# Patient Record
Sex: FEMALE | Race: WHITE | NOT HISPANIC OR LATINO | URBAN - METROPOLITAN AREA
[De-identification: names, ages, dates, MRNs, and addresses within clinical notes are randomized per-mention and may not be internally consistent; named-entity substitution may affect disease eponyms.]

---

## 2017-10-17 PROBLEM — 10743008 IRRITABLE BOWEL SYNDROME: Status: ACTIVE | Noted: 2017-10-17

## 2017-10-17 PROBLEM — 414916001 OBESITY: Status: ACTIVE | Noted: 2017-10-17

## 2020-02-19 PROBLEM — 449681000124101 LONG-TERM CURRENT USE OF ANTIPLATELET DRUG: Status: ACTIVE | Noted: 2020-02-19

## 2020-06-08 ENCOUNTER — OFFICE VISIT (OUTPATIENT)
Dept: URBAN - METROPOLITAN AREA CLINIC 27 | Facility: CLINIC | Age: 75
End: 2020-06-08

## 2020-06-10 ENCOUNTER — LAB OUTSIDE AN ENCOUNTER (OUTPATIENT)
Dept: URBAN - METROPOLITAN AREA CLINIC 121 | Facility: CLINIC | Age: 75
End: 2020-06-10

## 2020-06-10 LAB
BASOPH COUNT: (no result)
BASOPHIL %: 1
BG RANDOM: 87
BUN/CREAT: (no result)
BUN: 10
CALCIUM: 9.4
CHLORIDE: 106
CO2: 28
CREATININE: 0.67
EOS COUNT: (no result)
EOSINOPHIL %: 1.8
HCT: 38.2
HGB: 13
LYMPHS %: 36.2
MAGNESIUM: 1.9
MCH: 33.3
MCHC: (no result)
MCV: 97.9
MONOCYTE %: 11.6
MONOSCT AUTO: (no result)
PLATELETS: (no result)
PMN %: 49.4
POTASSIUM: 4.1
RBC: (no result)
RDW: 12.2
WBC: (no result)
ZZ-GE-UNK: (no result)
ZZ-GE-UNK: (no result)

## 2021-01-12 ENCOUNTER — OFFICE VISIT (OUTPATIENT)
Dept: URBAN - METROPOLITAN AREA CLINIC 27 | Facility: CLINIC | Age: 76
End: 2021-01-12

## 2021-01-13 ENCOUNTER — LAB OUTSIDE AN ENCOUNTER (OUTPATIENT)
Dept: URBAN - METROPOLITAN AREA CLINIC 121 | Facility: CLINIC | Age: 76
End: 2021-01-13

## 2021-01-13 LAB
BASOPH COUNT: (no result)
BASOPHIL %: 1
BG RANDOM: 82
BUN/CREAT: (no result)
BUN: 16
CALCIUM: 9.6
CHLORIDE: 104
CO2: 29
CREATININE: 0.52
EOS COUNT: (no result)
EOSINOPHIL %: 1.8
HCT: 39.9
HGB: 13.3
LYMPHS %: 29.5
MCH: 32.8
MCHC: (no result)
MCV: 98.5
MONOCYTE %: 11.6
MONOSCT AUTO: (no result)
PLATELETS: (no result)
PMN %: 56.1
POTASSIUM: 4.5
RBC: (no result)
RDW: 11.5
WBC: (no result)
ZZ-GE-UNK: (no result)
ZZ-GE-UNK: (no result)

## 2021-02-23 ENCOUNTER — OFFICE VISIT (OUTPATIENT)
Dept: URBAN - METROPOLITAN AREA CLINIC 27 | Facility: CLINIC | Age: 76
End: 2021-02-23

## 2021-02-24 ENCOUNTER — LAB OUTSIDE AN ENCOUNTER (OUTPATIENT)
Dept: URBAN - METROPOLITAN AREA CLINIC 121 | Facility: CLINIC | Age: 76
End: 2021-02-24

## 2021-02-24 LAB
B-12: 382
BASOPH COUNT: (no result)
BASOPHIL %: 0.8
BG RANDOM: 181
BUN/CREAT: (no result)
BUN: 12
CALCIUM: 9.6
CHLORIDE: 103
CO2: 32
CREATININE: 0.56
EOS COUNT: (no result)
EOSINOPHIL %: 0.6
FOLATE: 18.3
HCT: 39.8
HGB: 13.1
LYMPHS %: 28.5
MCH: 33.2
MCHC: (no result)
MCV: 101
MONOCYTE %: 8.4
MONOSCT AUTO: (no result)
PLATELETS: (no result)
PMN %: 61.7
POTASSIUM: 3.8
RBC: (no result)
RDW: 11.8
TSH: 0.48
WBC: (no result)
ZZ-GE-UNK: (no result)
ZZ-GE-UNK: (no result)

## 2021-03-03 ENCOUNTER — LAB OUTSIDE AN ENCOUNTER (OUTPATIENT)
Dept: URBAN - METROPOLITAN AREA CLINIC 121 | Facility: CLINIC | Age: 76
End: 2021-03-03

## 2021-03-03 LAB
BG RANDOM: 91
ZZ-GE-UNK: (no result)

## 2022-04-30 ENCOUNTER — TELEPHONE ENCOUNTER (OUTPATIENT)
Dept: URBAN - METROPOLITAN AREA CLINIC 121 | Facility: CLINIC | Age: 77
End: 2022-04-30

## 2022-04-30 RX ORDER — OMEPRAZOLE 20 MG/1
QD CAPSULE, DELAYED RELEASE ORAL
OUTPATIENT
Start: 2013-08-30 | End: 2017-10-17

## 2022-04-30 RX ORDER — BUDESONIDE 3 MG/1
3 PILLS PO QD CAPSULE, COATED PELLETS ORAL
OUTPATIENT
Start: 2021-01-12

## 2022-04-30 RX ORDER — OMEPRAZOLE 20 MG/1
QD CAPSULE, DELAYED RELEASE ORAL
OUTPATIENT
Start: 2013-08-30

## 2022-04-30 RX ORDER — ASPIRIN 81 MG/1
TABLET, CHEWABLE ORAL
OUTPATIENT
Start: 2013-08-30

## 2022-04-30 RX ORDER — PRAVASTATIN SODIUM 20 MG/1
TABLET ORAL
OUTPATIENT
Start: 2013-08-30

## 2022-05-01 ENCOUNTER — TELEPHONE ENCOUNTER (OUTPATIENT)
Dept: URBAN - METROPOLITAN AREA CLINIC 121 | Facility: CLINIC | Age: 77
End: 2022-05-01

## 2022-05-01 RX ORDER — CALCIUM CITRATE/VITAMIN D3 200MG-6.25
TABLET ORAL
Status: ACTIVE | COMMUNITY
Start: 2017-10-17

## 2022-05-01 RX ORDER — ESCITALOPRAM 5 MG/1
TABLET, FILM COATED ORAL
Status: ACTIVE | COMMUNITY
Start: 2017-10-17

## 2022-05-01 RX ORDER — ERGOCALCIFEROL (VITAMIN D2) 10 MCG
QD TABLET ORAL
Status: ACTIVE | COMMUNITY
Start: 2013-10-08

## 2022-05-01 RX ORDER — ASPIRIN 81 MG/1
QD TABLET, CHEWABLE ORAL
Status: ACTIVE | COMMUNITY
Start: 2013-10-08

## 2022-05-01 RX ORDER — DICYCLOMINE HYDROCHLORIDE 20 MG/2ML
1CAPSULE PO Q8H PRN SPASMS INJECTION, SOLUTION INTRAMUSCULAR
Status: ACTIVE | COMMUNITY
Start: 2017-10-17

## 2022-05-01 RX ORDER — CALCIUM CARBONATE/VITAMIN D3 600 MG-20
TABLET ORAL
Status: ACTIVE | COMMUNITY
Start: 2020-02-19

## 2022-05-01 RX ORDER — BUDESONIDE 3 MG/1
TAKE 3 CAPSULES BY MOUTH ONCE A DAY CAPSULE, COATED PELLETS ORAL
Status: ACTIVE | COMMUNITY
Start: 2021-03-08

## 2022-05-01 RX ORDER — ESTRADIOL 10 UG/1
TABLET, FILM COATED VAGINAL
Status: ACTIVE | COMMUNITY
Start: 2020-02-19

## 2022-05-01 RX ORDER — PRAVASTATIN SODIUM 20 MG/1
QD TABLET ORAL
Status: ACTIVE | COMMUNITY
Start: 2013-10-08

## 2022-05-01 RX ORDER — DIPHENOXYLATE HCL/ATROPINE 2.5-.025MG
1 TABLET PO Q6H PRN TABLET ORAL
Status: ACTIVE | COMMUNITY
Start: 2020-06-08

## 2022-05-01 RX ORDER — ASPIRIN 81 MG/1
TABLET, DELAYED RELEASE ORAL
Status: ACTIVE | COMMUNITY
Start: 2020-02-19

## 2022-05-16 ENCOUNTER — TELEPHONE ENCOUNTER (OUTPATIENT)
Dept: URBAN - METROPOLITAN AREA CLINIC 27 | Facility: CLINIC | Age: 77
End: 2022-05-16

## 2022-06-08 ENCOUNTER — OFFICE VISIT (OUTPATIENT)
Dept: URBAN - METROPOLITAN AREA CLINIC 27 | Facility: CLINIC | Age: 77
End: 2022-06-08
Payer: COMMERCIAL

## 2022-06-08 ENCOUNTER — WEB ENCOUNTER (OUTPATIENT)
Dept: URBAN - METROPOLITAN AREA CLINIC 27 | Facility: CLINIC | Age: 77
End: 2022-06-08

## 2022-06-08 VITALS
HEIGHT: 65 IN | HEART RATE: 67 BPM | SYSTOLIC BLOOD PRESSURE: 112 MMHG | BODY MASS INDEX: 19.49 KG/M2 | RESPIRATION RATE: 17 BRPM | DIASTOLIC BLOOD PRESSURE: 67 MMHG | WEIGHT: 117 LBS | TEMPERATURE: 97.1 F

## 2022-06-08 DIAGNOSIS — R19.7 DIARRHEA, UNSPECIFIED TYPE: ICD-10-CM

## 2022-06-08 DIAGNOSIS — K52.832 LYMPHOCYTIC COLITIS: ICD-10-CM

## 2022-06-08 PROCEDURE — 99213 OFFICE O/P EST LOW 20 MIN: CPT | Performed by: INTERNAL MEDICINE

## 2022-06-08 RX ORDER — BUDESONIDE 3 MG/1
3 CAPSULE, COATED PELLETS ORAL ONCE A DAY
Qty: 90 | Refills: 2 | OUTPATIENT

## 2022-06-08 RX ORDER — BUDESONIDE 3 MG/1
TAKE 3 CAPSULES BY MOUTH ONCE A DAY CAPSULE, COATED PELLETS ORAL
Status: ACTIVE | COMMUNITY
Start: 2021-03-08

## 2022-06-08 RX ORDER — ERGOCALCIFEROL (VITAMIN D2) 10 MCG
QD TABLET ORAL
Status: ACTIVE | COMMUNITY
Start: 2013-10-08

## 2022-06-08 RX ORDER — PRAVASTATIN SODIUM 20 MG/1
QD TABLET ORAL
Status: ACTIVE | COMMUNITY
Start: 2013-10-08

## 2022-06-08 RX ORDER — CALCIUM CARBONATE/VITAMIN D3 600 MG-20
TABLET ORAL
Status: ACTIVE | COMMUNITY
Start: 2020-02-19

## 2022-06-08 RX ORDER — CALCIUM CITRATE/VITAMIN D3 200MG-6.25
TABLET ORAL
Status: ACTIVE | COMMUNITY
Start: 2017-10-17

## 2022-06-08 RX ORDER — ASPIRIN 81 MG/1
QD TABLET, CHEWABLE ORAL
Status: ACTIVE | COMMUNITY
Start: 2013-10-08

## 2022-06-08 RX ORDER — DICYCLOMINE HYDROCHLORIDE 20 MG/2ML
1CAPSULE PO Q8H PRN SPASMS INJECTION, SOLUTION INTRAMUSCULAR
Status: ACTIVE | COMMUNITY
Start: 2017-10-17

## 2022-06-08 RX ORDER — ESTRADIOL 10 UG/1
TABLET, FILM COATED VAGINAL
Status: ACTIVE | COMMUNITY
Start: 2020-02-19

## 2022-06-08 RX ORDER — DIPHENOXYLATE HCL/ATROPINE 2.5-.025MG
1 TABLET PO Q6H PRN TABLET ORAL
Status: ACTIVE | COMMUNITY
Start: 2020-06-08

## 2022-06-08 RX ORDER — ASPIRIN 81 MG/1
TABLET, DELAYED RELEASE ORAL
Status: ACTIVE | COMMUNITY
Start: 2020-02-19

## 2022-06-08 RX ORDER — ESCITALOPRAM 5 MG/1
TABLET, FILM COATED ORAL
Status: ACTIVE | COMMUNITY
Start: 2017-10-17

## 2022-06-08 NOTE — HPI-TODAY'S VISIT:
Ms. Wells is a 78 y/o established patient with lymphocytic colitis who was asymptomaticfor about 1 year. She stopped taking Budesonide im early 2021. She presents today with diarrhea. She has greater than 10 watery stools per day. Imodium is not helpful. Her last colonoscopy was march 2020.

## 2022-06-09 ENCOUNTER — LAB OUTSIDE AN ENCOUNTER (OUTPATIENT)
Dept: URBAN - METROPOLITAN AREA CLINIC 27 | Facility: CLINIC | Age: 77
End: 2022-06-09

## 2022-06-15 LAB
CALPROTECTIN, FECAL: 673
CLOSTRIDIUM DIFFICILE: (no result)

## 2022-06-23 ENCOUNTER — CLAIMS CREATED FROM THE CLAIM WINDOW (OUTPATIENT)
Dept: URBAN - METROPOLITAN AREA CLINIC 27 | Facility: CLINIC | Age: 77
End: 2022-06-23
Payer: COMMERCIAL

## 2022-06-23 VITALS
HEART RATE: 83 BPM | RESPIRATION RATE: 17 BRPM | SYSTOLIC BLOOD PRESSURE: 130 MMHG | WEIGHT: 117 LBS | TEMPERATURE: 97.3 F | HEIGHT: 65 IN | DIASTOLIC BLOOD PRESSURE: 86 MMHG | BODY MASS INDEX: 19.49 KG/M2

## 2022-06-23 DIAGNOSIS — R19.7 DIARRHEA, UNSPECIFIED: ICD-10-CM

## 2022-06-23 DIAGNOSIS — K21.00 ALKALINE REFLUX ESOPHAGITIS: ICD-10-CM

## 2022-06-23 DIAGNOSIS — R10.84 GENERALIZED ABDOMINAL PAIN: ICD-10-CM

## 2022-06-23 DIAGNOSIS — K21.0 GASTRO-ESOPHAGEAL REFLUX DISEASE WITH ESOPHAGITIS: ICD-10-CM

## 2022-06-23 DIAGNOSIS — K52.832 LYMPHOCYTIC COLITIS: ICD-10-CM

## 2022-06-23 PROCEDURE — 99214 OFFICE O/P EST MOD 30 MIN: CPT | Performed by: INTERNAL MEDICINE

## 2022-06-23 RX ORDER — PRAVASTATIN SODIUM 20 MG/1
QD TABLET ORAL
Status: ACTIVE | COMMUNITY
Start: 2013-10-08

## 2022-06-23 RX ORDER — DIPHENOXYLATE HCL/ATROPINE 2.5-.025MG
1 TABLET PO Q6H PRN TABLET ORAL
Status: ACTIVE | COMMUNITY
Start: 2020-06-08

## 2022-06-23 RX ORDER — ESCITALOPRAM 5 MG/1
TABLET, FILM COATED ORAL
Status: ACTIVE | COMMUNITY
Start: 2017-10-17

## 2022-06-23 RX ORDER — ESTRADIOL 10 UG/1
TABLET, FILM COATED VAGINAL
Status: ACTIVE | COMMUNITY
Start: 2020-02-19

## 2022-06-23 RX ORDER — ERGOCALCIFEROL (VITAMIN D2) 10 MCG
QD TABLET ORAL
Status: ACTIVE | COMMUNITY
Start: 2013-10-08

## 2022-06-23 RX ORDER — ASPIRIN 81 MG/1
TABLET, DELAYED RELEASE ORAL
Status: ACTIVE | COMMUNITY
Start: 2020-02-19

## 2022-06-23 RX ORDER — BUDESONIDE 3 MG/1
TAKE 3 CAPSULES BY MOUTH ONCE A DAY CAPSULE, COATED PELLETS ORAL
Status: ACTIVE | COMMUNITY
Start: 2021-03-08

## 2022-06-23 RX ORDER — CALCIUM CITRATE/VITAMIN D3 200MG-6.25
TABLET ORAL
Status: ACTIVE | COMMUNITY
Start: 2017-10-17

## 2022-06-23 RX ORDER — BUDESONIDE 3 MG/1
3 CAPSULE, COATED PELLETS ORAL ONCE A DAY
Qty: 90 | Refills: 2 | Status: ACTIVE | COMMUNITY

## 2022-06-23 RX ORDER — DICYCLOMINE HYDROCHLORIDE 20 MG/2ML
1CAPSULE PO Q8H PRN SPASMS INJECTION, SOLUTION INTRAMUSCULAR
Status: ACTIVE | COMMUNITY
Start: 2017-10-17

## 2022-06-23 RX ORDER — ASPIRIN 81 MG/1
QD TABLET, CHEWABLE ORAL
Status: ACTIVE | COMMUNITY
Start: 2013-10-08

## 2022-06-23 RX ORDER — CALCIUM CARBONATE/VITAMIN D3 600 MG-20
TABLET ORAL
Status: ACTIVE | COMMUNITY
Start: 2020-02-19

## 2022-06-23 NOTE — HPI-TODAY'S VISIT:
This is a 77-year-old female seen in consultation for her diarrhea.  She has lymphocytic colitis.  She had a flare about a month ago that was severe.  With daily loose stools.  C. difficile was negative but fecal calprotectin was elevated.  She was placed on budesonide 3 pills a day and within 2 weeks her symptoms have mostly resolved.  She is now on 2 pills a day the cramping has resolved.  No bleeding.  Her weight is stable

## 2022-06-24 PROBLEM — 102614006 GENERALIZED ABDOMINAL PAIN: Status: ACTIVE | Noted: 2020-02-19

## 2022-09-12 ENCOUNTER — DASHBOARD ENCOUNTERS (OUTPATIENT)
Age: 77
End: 2022-09-12

## 2022-09-12 ENCOUNTER — OFFICE VISIT (OUTPATIENT)
Dept: URBAN - METROPOLITAN AREA CLINIC 27 | Facility: CLINIC | Age: 77
End: 2022-09-12
Payer: COMMERCIAL

## 2022-09-12 ENCOUNTER — TELEPHONE ENCOUNTER (OUTPATIENT)
Dept: URBAN - METROPOLITAN AREA CLINIC 27 | Facility: CLINIC | Age: 77
End: 2022-09-12

## 2022-09-12 VITALS
HEIGHT: 65 IN | RESPIRATION RATE: 17 BRPM | HEART RATE: 67 BPM | DIASTOLIC BLOOD PRESSURE: 99 MMHG | BODY MASS INDEX: 19.49 KG/M2 | SYSTOLIC BLOOD PRESSURE: 147 MMHG | TEMPERATURE: 97.1 F | WEIGHT: 117 LBS

## 2022-09-12 DIAGNOSIS — R19.7 DIARRHEA, UNSPECIFIED: ICD-10-CM

## 2022-09-12 DIAGNOSIS — K52.832 LYMPHOCYTIC COLITIS: ICD-10-CM

## 2022-09-12 PROBLEM — 62315008 DIARRHEA: Status: ACTIVE | Noted: 2020-02-19

## 2022-09-12 PROBLEM — 1187071008: Status: ACTIVE | Noted: 2022-06-10

## 2022-09-12 PROCEDURE — 99213 OFFICE O/P EST LOW 20 MIN: CPT | Performed by: INTERNAL MEDICINE

## 2022-09-12 RX ORDER — ASPIRIN 81 MG/1
QD TABLET, CHEWABLE ORAL
Status: ACTIVE | COMMUNITY
Start: 2013-10-08

## 2022-09-12 RX ORDER — BUDESONIDE 3 MG/1
TAKE 3 CAPSULES BY MOUTH ONCE A DAY CAPSULE, COATED PELLETS ORAL ONCE A DAY
Qty: 90 | Refills: 3

## 2022-09-12 RX ORDER — PRAVASTATIN SODIUM 20 MG/1
QD TABLET ORAL
Status: ACTIVE | COMMUNITY
Start: 2013-10-08

## 2022-09-12 RX ORDER — CALCIUM CARBONATE/VITAMIN D3 600 MG-20
TABLET ORAL
Status: ACTIVE | COMMUNITY
Start: 2020-02-19

## 2022-09-12 RX ORDER — DICYCLOMINE HYDROCHLORIDE 20 MG/2ML
1CAPSULE PO Q8H PRN SPASMS INJECTION, SOLUTION INTRAMUSCULAR
Status: ACTIVE | COMMUNITY
Start: 2017-10-17

## 2022-09-12 RX ORDER — DIPHENOXYLATE HCL/ATROPINE 2.5-.025MG
1 TABLET PO Q6H PRN TABLET ORAL
Status: ACTIVE | COMMUNITY
Start: 2020-06-08

## 2022-09-12 RX ORDER — ASPIRIN 81 MG/1
TABLET, DELAYED RELEASE ORAL
Status: ACTIVE | COMMUNITY
Start: 2020-02-19

## 2022-09-12 RX ORDER — CALCIUM CITRATE/VITAMIN D3 200MG-6.25
TABLET ORAL
Status: ACTIVE | COMMUNITY
Start: 2017-10-17

## 2022-09-12 RX ORDER — ERGOCALCIFEROL (VITAMIN D2) 10 MCG
QD TABLET ORAL
Status: ACTIVE | COMMUNITY
Start: 2013-10-08

## 2022-09-12 RX ORDER — BUDESONIDE 3 MG/1
3 CAPSULE, COATED PELLETS ORAL ONCE A DAY
Qty: 90 | Refills: 2 | Status: ACTIVE | COMMUNITY

## 2022-09-12 RX ORDER — ESTRADIOL 10 UG/1
TABLET, FILM COATED VAGINAL
Status: ACTIVE | COMMUNITY
Start: 2020-02-19

## 2022-09-12 RX ORDER — ESCITALOPRAM 5 MG/1
TABLET, FILM COATED ORAL
Status: ACTIVE | COMMUNITY
Start: 2017-10-17

## 2022-09-12 RX ORDER — BUDESONIDE 3 MG/1
TAKE 3 CAPSULES BY MOUTH ONCE A DAY CAPSULE, COATED PELLETS ORAL
Status: ACTIVE | COMMUNITY
Start: 2021-03-08

## 2022-09-12 NOTE — HPI-TODAY'S VISIT:
This is a 77-year-old female with a history of lymphocytic colitis and recurrent diarrhea.  In June she had a flare of symptoms and restarted budesonide.  She tapered it over 3 months and stopped it in August.  She is now back to her baseline with formed stools.  She is concerned about the severity of her flares when they occur.  She also is interested in getting vaccinated and wanted my opinion

## 2022-11-30 ENCOUNTER — P2P PATIENT RECORD (OUTPATIENT)
Age: 77
End: 2022-11-30

## 2022-12-29 ENCOUNTER — P2P PATIENT RECORD (OUTPATIENT)
Age: 77
End: 2022-12-29

## 2025-03-14 ENCOUNTER — TELEPHONE ENCOUNTER (OUTPATIENT)
Dept: URBAN - METROPOLITAN AREA CLINIC 27 | Facility: CLINIC | Age: 80
End: 2025-03-14

## 2025-03-21 ENCOUNTER — OFFICE VISIT (OUTPATIENT)
Dept: URBAN - METROPOLITAN AREA CLINIC 27 | Facility: CLINIC | Age: 80
End: 2025-03-21
Payer: MEDICARE

## 2025-03-21 DIAGNOSIS — R19.4 CHANGE IN BOWEL HABITS: ICD-10-CM

## 2025-03-21 DIAGNOSIS — R19.7 ACUTE DIARRHEA: ICD-10-CM

## 2025-03-21 DIAGNOSIS — K52.832 LYMPHOCYTIC COLITIS: ICD-10-CM

## 2025-03-21 PROCEDURE — 99243 OFF/OP CNSLTJ NEW/EST LOW 30: CPT | Performed by: PHYSICIAN ASSISTANT

## 2025-03-21 RX ORDER — PRAVASTATIN SODIUM 20 MG/1
QD TABLET ORAL
Status: ACTIVE | COMMUNITY
Start: 2013-10-08

## 2025-03-21 RX ORDER — ESTRADIOL 10 UG/1
TABLET, FILM COATED VAGINAL
Status: ACTIVE | COMMUNITY
Start: 2020-02-19

## 2025-03-21 RX ORDER — DIPHENOXYLATE HCL/ATROPINE 2.5-.025MG
1 TABLET PO Q6H PRN TABLET ORAL
Status: DISCONTINUED | COMMUNITY
Start: 2020-06-08

## 2025-03-21 RX ORDER — BUDESONIDE 3 MG/1
TAKE 3 CAPSULES BY MOUTH ONCE A DAY CAPSULE, COATED PELLETS ORAL ONCE A DAY
Qty: 90 | Refills: 3 | Status: DISCONTINUED | COMMUNITY

## 2025-03-21 RX ORDER — CALCIUM CITRATE/VITAMIN D3 200MG-6.25
TABLET ORAL
Status: DISCONTINUED | COMMUNITY
Start: 2017-10-17

## 2025-03-21 RX ORDER — BUDESONIDE 3 MG/1
3 CAPSULE, COATED PELLETS ORAL ONCE A DAY
Qty: 90 | Refills: 2 | Status: DISCONTINUED | COMMUNITY

## 2025-03-21 RX ORDER — ERGOCALCIFEROL (VITAMIN D2) 10 MCG
QD TABLET ORAL
Status: ACTIVE | COMMUNITY
Start: 2013-10-08

## 2025-03-21 RX ORDER — ASPIRIN 81 MG/1
QD TABLET, CHEWABLE ORAL
Status: DISCONTINUED | COMMUNITY
Start: 2013-10-08

## 2025-03-21 RX ORDER — ASPIRIN 81 MG/1
TABLET, DELAYED RELEASE ORAL
Status: DISCONTINUED | COMMUNITY
Start: 2020-02-19

## 2025-03-21 RX ORDER — DICYCLOMINE HYDROCHLORIDE 20 MG/2ML
1CAPSULE PO Q8H PRN SPASMS INJECTION, SOLUTION INTRAMUSCULAR
Status: DISCONTINUED | COMMUNITY
Start: 2017-10-17

## 2025-03-21 RX ORDER — CALCIUM CARBONATE/VITAMIN D3 600 MG-20
TABLET ORAL
Status: DISCONTINUED | COMMUNITY
Start: 2020-02-19

## 2025-03-21 RX ORDER — ESCITALOPRAM 5 MG/1
TABLET, FILM COATED ORAL
Status: ACTIVE | COMMUNITY
Start: 2017-10-17

## 2025-03-21 NOTE — HPI-TODAY'S VISIT:
Ms. 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 She was last seen in 2022. She stopped taking Budesonide in 2022.  Overall, she's been asymptomatic until 3/13/25. She started having 5 non-bloody loose stools per day. No nocturnal BMs. She contacted our office and was advised to start Imodium. She took 2 Imodium caplets on 3/14 and the diarrhea stopped for 2 days. The diarrhea returned on 3/16/25 with 3 loose stools that day. She took 2 Imodium on 3/17. No BMs over the past 4 days. She denies nausea, vomiting, fever, chills, melena or hematochezia.   She denies new medication, foreign travel, sick contacts or recent abx use.

## 2025-03-26 ENCOUNTER — OFFICE VISIT (OUTPATIENT)
Dept: URBAN - METROPOLITAN AREA CLINIC 27 | Facility: CLINIC | Age: 80
End: 2025-03-26
Payer: MEDICARE

## 2025-03-26 DIAGNOSIS — R19.7 DIARRHEA, UNSPECIFIED: ICD-10-CM

## 2025-03-26 DIAGNOSIS — K52.832 LYMPHOCYTIC COLITIS: ICD-10-CM

## 2025-03-26 PROCEDURE — 99214 OFFICE O/P EST MOD 30 MIN: CPT | Performed by: INTERNAL MEDICINE

## 2025-03-26 RX ORDER — ESCITALOPRAM 5 MG/1
TABLET, FILM COATED ORAL
Status: ACTIVE | COMMUNITY
Start: 2017-10-17

## 2025-03-26 RX ORDER — PRAVASTATIN SODIUM 20 MG/1
QD TABLET ORAL
Status: ACTIVE | COMMUNITY
Start: 2013-10-08

## 2025-03-26 RX ORDER — BUDESONIDE 3 MG/1
3 CAPSULES CAPSULE ORAL ONCE A DAY
Qty: 90 CAPSULE | Refills: 1 | OUTPATIENT
Start: 2025-03-26

## 2025-03-26 RX ORDER — ESTRADIOL 10 UG/1
TABLET, FILM COATED VAGINAL
Status: ACTIVE | COMMUNITY
Start: 2020-02-19

## 2025-03-26 RX ORDER — ERGOCALCIFEROL (VITAMIN D2) 10 MCG
QD TABLET ORAL
Status: ACTIVE | COMMUNITY
Start: 2013-10-08

## 2025-03-26 NOTE — HPI-TODAY'S VISIT:
This is a 79-year-old female previously diagnosed with lymphocytic colitis she been doing well for the past 3 years.  Then about 3 weeks ago she developed diarrhea.  It would slow with Imodium.  She thinks it started when she ate a ham sandwich.  But the diarrhea has persisted.  She has some associated lower abdominal cramping but no bleeding.  She feels this is similar to her previous episodes of lymphocytic colitis.  She saw the PA last week but continues with the symptoms and would like treatment for the lymphocytic colitis.  She does have some's more solid stools in between the liquid stools she has not been eating and has lost a little bit of weight.  But she otherwise feels well.  No fevers

## 2025-07-16 ENCOUNTER — OFFICE VISIT (OUTPATIENT)
Dept: URBAN - METROPOLITAN AREA CLINIC 27 | Facility: CLINIC | Age: 80
End: 2025-07-16
Payer: MEDICARE

## 2025-07-16 ENCOUNTER — TELEPHONE ENCOUNTER (OUTPATIENT)
Dept: URBAN - METROPOLITAN AREA CLINIC 27 | Facility: CLINIC | Age: 80
End: 2025-07-16

## 2025-07-16 DIAGNOSIS — K52.832 LYMPHOCYTIC COLITIS: ICD-10-CM

## 2025-07-16 DIAGNOSIS — R19.7 DIARRHEA, UNSPECIFIED: ICD-10-CM

## 2025-07-16 PROCEDURE — 99213 OFFICE O/P EST LOW 20 MIN: CPT | Performed by: INTERNAL MEDICINE

## 2025-07-16 RX ORDER — ESCITALOPRAM 5 MG/1
TABLET, FILM COATED ORAL
Status: ACTIVE | COMMUNITY
Start: 2017-10-17

## 2025-07-16 RX ORDER — ERGOCALCIFEROL (VITAMIN D2) 10 MCG
QD TABLET ORAL
Status: ACTIVE | COMMUNITY
Start: 2013-10-08

## 2025-07-16 RX ORDER — BUDESONIDE 3 MG/1
3 CAPSULES CAPSULE ORAL ONCE A DAY
Qty: 90 CAPSULE | Refills: 1 | Status: ACTIVE | COMMUNITY
Start: 2025-03-26

## 2025-07-16 RX ORDER — PRAVASTATIN SODIUM 20 MG/1
QD TABLET ORAL
Status: ACTIVE | COMMUNITY
Start: 2013-10-08

## 2025-07-16 RX ORDER — ESTRADIOL 10 UG/1
TABLET, FILM COATED VAGINAL
Status: ACTIVE | COMMUNITY
Start: 2020-02-19

## 2025-07-16 NOTE — HPI-HPI
Danya Wells, an 80-year-old female, presented for a chronic condition follow-up focused on the recurrence of watery diarrhea. After previously tapering off budesonide-starting at three tablets daily, then reducing to two, then one, and finally stopping-she initially felt well. However, over the past week and a half, she experienced a return of symptoms, including persistent watery stools, with one particularly severe day followed by several days without any bowel movements. She described a recent bowel movement as slightly formed but still watery and noted slight abdominal tenderness. Throughout this episode, she remained meticulous with her diet, consuming only light foods such as pureed soup and toast, and considered stress as a possible but mild contributing factor. She expressed uncertainty about whether to resume budesonide and at what dose. She denied having fever or urinary symptoms. She also reported monitoring her blood pressure, which was elevated to 157 on the morning of the visit, and described feeling washed out during the worst days but noted some improvement by the day of the visit. Her careful attention to dietary choices and bowel patterns reflects her proactive approach to managing her health amidst the unpredictable nature of her condition.

## 2025-07-17 LAB
HEMATOCRIT: 41.8
HEMOGLOBIN: 13.1
MCH: 32.3
MCHC: 31.3
MCV: 103.2
MPV: 9.5
PLATELET COUNT: 278
RDW: 12.8
RED BLOOD CELL COUNT: 4.05
WHITE BLOOD CELL COUNT: 5.2

## 2025-07-18 ENCOUNTER — TELEPHONE ENCOUNTER (OUTPATIENT)
Dept: URBAN - METROPOLITAN AREA CLINIC 27 | Facility: CLINIC | Age: 80
End: 2025-07-18

## 2025-07-19 LAB
FOLATE, SERUM: 16.2
VITAMIN B12: 277

## 2025-07-24 ENCOUNTER — TELEPHONE ENCOUNTER (OUTPATIENT)
Dept: URBAN - METROPOLITAN AREA CLINIC 27 | Facility: CLINIC | Age: 80
End: 2025-07-24

## 2025-07-29 ENCOUNTER — TELEPHONE ENCOUNTER (OUTPATIENT)
Dept: URBAN - METROPOLITAN AREA CLINIC 27 | Facility: CLINIC | Age: 80
End: 2025-07-29

## 2025-08-04 ENCOUNTER — OFFICE VISIT (OUTPATIENT)
Dept: URBAN - METROPOLITAN AREA CLINIC 27 | Facility: CLINIC | Age: 80
End: 2025-08-04